# Patient Record
Sex: FEMALE | Race: WHITE | Employment: PART TIME | URBAN - METROPOLITAN AREA
[De-identification: names, ages, dates, MRNs, and addresses within clinical notes are randomized per-mention and may not be internally consistent; named-entity substitution may affect disease eponyms.]

---

## 2017-01-17 ENCOUNTER — OFFICE VISIT (OUTPATIENT)
Dept: DERMATOLOGY | Age: 42
End: 2017-01-17

## 2017-01-17 DIAGNOSIS — D48.9 NEOPLASM OF UNCERTAIN BEHAVIOR: ICD-10-CM

## 2017-01-17 DIAGNOSIS — D22.9 MULTIPLE NEVI: Primary | ICD-10-CM

## 2017-01-17 DIAGNOSIS — Z87.2 HISTORY OF ACTINIC KERATOSES: ICD-10-CM

## 2017-01-17 DIAGNOSIS — D22.9 HALO NEVUS: ICD-10-CM

## 2017-01-17 PROCEDURE — 11100 PR BIOPSY OF SKIN LESION: CPT | Performed by: DERMATOLOGY

## 2017-01-17 PROCEDURE — 99213 OFFICE O/P EST LOW 20 MIN: CPT | Performed by: DERMATOLOGY

## 2017-01-23 ENCOUNTER — TELEPHONE (OUTPATIENT)
Dept: DERMATOLOGY | Age: 42
End: 2017-01-23

## 2017-03-29 ENCOUNTER — HOSPITAL ENCOUNTER (OUTPATIENT)
Dept: MAMMOGRAPHY | Age: 42
Discharge: HOME | End: 2017-03-29
Attending: OBSTETRICS & GYNECOLOGY | Admitting: OBSTETRICS & GYNECOLOGY

## 2017-03-29 DIAGNOSIS — Z12.31 ENCOUNTER FOR SCREENING MAMMOGRAM FOR BREAST CANCER: ICD-10-CM

## 2018-01-22 ENCOUNTER — OFFICE VISIT (OUTPATIENT)
Dept: DERMATOLOGY | Age: 43
End: 2018-01-22

## 2018-01-22 DIAGNOSIS — L70.0 ACNE VULGARIS: ICD-10-CM

## 2018-01-22 DIAGNOSIS — D48.5 NEOPLASM OF UNCERTAIN BEHAVIOR OF SKIN: ICD-10-CM

## 2018-01-22 DIAGNOSIS — L57.0 AK (ACTINIC KERATOSIS): ICD-10-CM

## 2018-01-22 DIAGNOSIS — D22.9 HALO NEVUS: ICD-10-CM

## 2018-01-22 DIAGNOSIS — D22.9 MULTIPLE NEVI: Primary | ICD-10-CM

## 2018-01-22 PROCEDURE — G8421 BMI NOT CALCULATED: HCPCS | Performed by: DERMATOLOGY

## 2018-01-22 PROCEDURE — G8484 FLU IMMUNIZE NO ADMIN: HCPCS | Performed by: DERMATOLOGY

## 2018-01-22 PROCEDURE — 17000 DESTRUCT PREMALG LESION: CPT | Performed by: DERMATOLOGY

## 2018-01-22 PROCEDURE — 17003 DESTRUCT PREMALG LES 2-14: CPT | Performed by: DERMATOLOGY

## 2018-01-22 PROCEDURE — G8427 DOCREV CUR MEDS BY ELIG CLIN: HCPCS | Performed by: DERMATOLOGY

## 2018-01-22 PROCEDURE — 1036F TOBACCO NON-USER: CPT | Performed by: DERMATOLOGY

## 2018-01-22 PROCEDURE — 11100 PR BIOPSY OF SKIN LESION: CPT | Performed by: DERMATOLOGY

## 2018-01-22 PROCEDURE — 99214 OFFICE O/P EST MOD 30 MIN: CPT | Performed by: DERMATOLOGY

## 2018-01-22 RX ORDER — TRETINOIN 0.025 %
CREAM (GRAM) TOPICAL
Qty: 1 TUBE | Refills: 3 | Status: SHIPPED | OUTPATIENT
Start: 2018-01-22 | End: 2019-02-19 | Stop reason: SDUPTHER

## 2018-01-22 NOTE — PROGRESS NOTES
[facility]  Kimber Benjamin MD  [address] [facility]  1975    38 y.o. female     Date of Visit: 1/22/2018    Chief Complaint: moles, f/u AK  Chief Complaint   Patient presents with    Skin Exam     Last seen: 1-2017; her mother is a patient - Alysia Wilkinson; she has seen Dr. Nupur Jacobs in the past.    History of Present Illness:    1. Here for evaluation of multiple asx pigmented lesions on the trunk and extremities, present for many years; no change in size/shape/color of any lesions; no bleeding lesions. She has one area on the central chest/breast that developed as a pale [place] 2 years ago. She doesn't recall any lesion centrally and it hasn't changed since last seen. 2. Hx of AK - L upper back. No probs with this site. Has a few new rough lesions on the chest.    3. She notes a tender area on the R upper back when in the shower. Not sure if it is related to a mole here. 4. She has occsaional breakouts on the face. Would like to try tretinoin. Bx's in :  Lt upper back under racerback strap-actinic keratosis, pre-cancer. Return for recheck and LN2 if needed in 3 mos. Rt inframammary-benign nevus  Central back right of midline-benign nevus    No hx of skin cancer. She had a lot of sun exposure growing up; wears sunscreen and avoids the sun as an adult. Tanning beds in the past as a teen. Review of Systems:  Gen: Feels well, good sense of health. Skin: No changing moles or lesions. Past Medical History, Family History, Surgical History, Medications and Allergies reviewed.     Past Medical History:   Diagnosis Date    Bunion     IUD        Past Surgical History:   Procedure Laterality Date    BUNIONECTOMY      left       No outpatient prescriptions have been marked as taking for the 1/22/18 encounter (Office Visit) with Tana Saldana MD.       Allergies   Allergen Reactions    Penicillins          Physical Examination     Gen,

## 2018-01-25 ENCOUNTER — TELEPHONE (OUTPATIENT)
Dept: DERMATOLOGY | Age: 43
End: 2018-01-25

## 2018-11-20 ENCOUNTER — TELEPHONE (OUTPATIENT)
Dept: DERMATOLOGY | Age: 43
End: 2018-11-20

## 2019-02-19 ENCOUNTER — OFFICE VISIT (OUTPATIENT)
Dept: DERMATOLOGY | Age: 44
End: 2019-02-19
Payer: COMMERCIAL

## 2019-02-19 DIAGNOSIS — D22.9 MULTIPLE NEVI: Primary | ICD-10-CM

## 2019-02-19 DIAGNOSIS — D22.9 HALO NEVUS: ICD-10-CM

## 2019-02-19 DIAGNOSIS — D48.5 NEOPLASM OF UNCERTAIN BEHAVIOR OF SKIN: ICD-10-CM

## 2019-02-19 DIAGNOSIS — Z87.2 HISTORY OF ACTINIC KERATOSIS: ICD-10-CM

## 2019-02-19 DIAGNOSIS — L70.0 ACNE VULGARIS: ICD-10-CM

## 2019-02-19 PROCEDURE — 11102 TANGNTL BX SKIN SINGLE LES: CPT | Performed by: DERMATOLOGY

## 2019-02-19 PROCEDURE — 99213 OFFICE O/P EST LOW 20 MIN: CPT | Performed by: DERMATOLOGY

## 2019-02-19 PROCEDURE — 11103 TANGNTL BX SKIN EA SEP/ADDL: CPT | Performed by: DERMATOLOGY

## 2019-02-21 LAB — DERMATOLOGY PATHOLOGY REPORT: NORMAL

## 2019-03-04 NOTE — TELEPHONE ENCOUNTER
Submitted PA through [employer] com. Received fax with PA approval from 3/4/19-3/3/20. Will scan and notify pharmacy.

## 2020-01-21 ENCOUNTER — OFFICE VISIT (OUTPATIENT)
Dept: DERMATOLOGY | Age: 45
End: 2020-01-21
Payer: COMMERCIAL

## 2020-01-21 PROCEDURE — 99214 OFFICE O/P EST MOD 30 MIN: CPT | Performed by: DERMATOLOGY

## 2020-01-21 NOTE — PROGRESS NOTES
MD Mirza.       Allergies   Allergen Reactions    Penicillins          Physical Examination     Gen, well-appearing  The following were examined and determined to be normal: Psych/Neuro, Scalp/hair, Conjunctivae/eyelids, Gums/teeth/lips, Neck, RUE, LUE, RLE, LLE, Nails/digits and buttocks. Abdome. Chest, face, bback  The following were examined and determined to be abnormal: none    trunk and extremities with numerous brown macules and papules; central chest/breast with 2 cm hypopigmented round macule; no central lesion - stable  Chest clear  No acne                *photos from previous bx's in chart    Assessment and Plan     1. Multiple benign-appearing nevi and halo nevus (central chest btwn breasts) - stable  2. AK's - chest - clear today  - educ re ABCD's of MM   educ sun protection   encouraged skin check yearly (sooner if indicated), self checks    3. Lentigines - chest - ed IPL for lentigines     3.  Acne vulgaris - mild  - Tretinoin 0.025% cr qhs to face  educ irrit and photosens, no preg  - also discussed cosmetic potential with of tretinoin

## 2020-06-24 ENCOUNTER — OFFICE VISIT (OUTPATIENT)
Dept: PRIMARY CARE CLINIC | Age: 45
End: 2020-06-24
Payer: COMMERCIAL

## 2020-06-24 VITALS — OXYGEN SATURATION: 98 % | TEMPERATURE: 98.3 F | HEART RATE: 68 BPM

## 2020-06-24 PROCEDURE — 99212 OFFICE O/P EST SF 10 MIN: CPT | Performed by: NURSE PRACTITIONER

## 2020-06-26 LAB
SARS-COV-2: NOT DETECTED
SOURCE: NORMAL

## 2020-09-30 NOTE — PROGRESS NOTES
Francisco Quiñonez   1975, 39 y.o. female   [id number]       Referring Provider: Michelle Blankenship MD  Referral Type: In an order in [address] [place] [address]    Reason for Visit: Evaluation of suspected change in hearing, tinnitus, or balance. ADULT AUDIOLOGIC EVALUATION      Francisco Quiñonez is a 39 y.o. female seen today, 10/9/2020 for an initial audiologic evaluation. Temperature taken during ENT appointment    [address] [facility] HISTORY:        Francisco Quiñonez noted ear pressure bilaterally, feels deep in her ears; sensitivity to loud sounds, present for about a year; history of vertigo about 4 months ago that lasted 2-3 weeks, spinning sensation; some general noise exposure from listening to music in the house, and appliances like a  that was used daily. Francisco Quiñonez denied otalgia, otorrhea, tinnitus, history of head trauma, history of ear surgery, and family history of hearing loss. IMPRESSIONS:       Today's results are consistent with hearing sensitivity within normal limits with normal middle ear function bilaterally. Discussed good communication strategies and noise-induced hearing loss/prevention. Follow recommendations from Dr. Mayur Cee regarding ear pressure. ASSESSMENT AND FINDINGS:       Otoscopy revealed: Clear ear canals bilaterally      RIGHT EAR:  Hearing Sensitivity: Within normal limits. Speech Recognition Threshold: 5 dB HL  Word Recognition: Excellent (96%), based on NU-6 25-word list at 45 dBHL using recorded speech stimuli. This finding is consistent with hearing sensitivity. Tympanometry: Normal peak pressure and compliance, Type A tympanogram, consistent with normal middle ear function. LEFT EAR:  Hearing Sensitivity: Within normal limits. Speech Recognition Threshold: 5 dB HL  Word Recognition: Excellent (100%), based on NU-6 25-word list at 45 dBHL using recorded speech stimuli.   This finding is consistent with hearing

## 2020-10-09 ENCOUNTER — OFFICE VISIT (OUTPATIENT)
Dept: ENT CLINIC | Age: 45
End: 2020-10-09
Payer: COMMERCIAL

## 2020-10-09 ENCOUNTER — PROCEDURE VISIT (OUTPATIENT)
Dept: AUDIOLOGY | Age: 45
End: 2020-10-09
Payer: COMMERCIAL

## 2020-10-09 VITALS
HEIGHT: 67 IN | SYSTOLIC BLOOD PRESSURE: 125 MMHG | RESPIRATION RATE: 16 BRPM | DIASTOLIC BLOOD PRESSURE: 81 MMHG | HEART RATE: 76 BPM | BODY MASS INDEX: 19.68 KG/M2 | TEMPERATURE: 97.9 F | WEIGHT: 125.4 LBS

## 2020-10-09 PROCEDURE — G8420 CALC BMI NORM PARAMETERS: HCPCS | Performed by: OTOLARYNGOLOGY

## 2020-10-09 PROCEDURE — 92567 TYMPANOMETRY: CPT | Performed by: AUDIOLOGIST

## 2020-10-09 PROCEDURE — 92552 PURE TONE AUDIOMETRY AIR: CPT | Performed by: AUDIOLOGIST

## 2020-10-09 PROCEDURE — G8484 FLU IMMUNIZE NO ADMIN: HCPCS | Performed by: OTOLARYNGOLOGY

## 2020-10-09 PROCEDURE — 92556 SPEECH AUDIOMETRY COMPLETE: CPT | Performed by: AUDIOLOGIST

## 2020-10-09 PROCEDURE — 99203 OFFICE O/P NEW LOW 30 MIN: CPT | Performed by: OTOLARYNGOLOGY

## 2020-10-09 PROCEDURE — G8427 DOCREV CUR MEDS BY ELIG CLIN: HCPCS | Performed by: OTOLARYNGOLOGY

## 2020-10-09 RX ORDER — TRIAMTERENE AND HYDROCHLOROTHIAZIDE 37.5; 25 MG/1; MG/1
1 TABLET ORAL DAILY
Qty: 30 TABLET | Refills: 3 | Status: SHIPPED | OUTPATIENT
Start: 2020-10-09

## 2020-10-09 NOTE — Clinical Note
Dr. Lázaro Clarke,    Please see note from this patient's audiogram from today. Please let me know if there is anything further you need.       Vanita Prasad [address] [place]  Audiologist

## 2020-10-09 NOTE — PATIENT INSTRUCTIONS
directly into a persons ear      Some additional items that may be helpful:   - Remain patient - this is important for both parties   - Write down items that still cannot be heard/understood. You may write with pen/paper or consider typing/texting on a cell phone or smart device. - If background noise is unavoidable, try to keep yourself in a good position in the room. By sitting at a bailon on the side of the restaurant (preferably a corner), it will be easier to communicate than if you were sitting at a table in the middle with background noise surrounding you. Keep yourself positioned away from music speakers or heavy foot traffic.   - If you have difficulty with the television, consider these options:      -- Use closed-captioning, which is a setting you can turn on that displays the spoken words in a written form on the screen. There may be a slight delay, but this can help fill in missing information. This can be especially helpful when watching programs with accented speech. -- Consider use of a sound bar or speakers that come from the front of the TV. With modern flat screen TVs, many of them have speakers that come out of the back of the device, which makes sound bounce off the wall behind it, then go into the room. Sound bars can allow the sound to go straight in your direction and can improve sound quality. -- Consider ear level devices to help improve the volume and/or sound quality of the program.  There are devices that work like headphones that you can adjust the volume for your ears while others can have the volume at a more comfortable level, such as \"TV Ears\". Most hearing aids have devices that allow them to connect directly to the TV and improve sound quality. Noise-Induced Hearing Loss  What it is, and what you can do to prevent it    Exposure to loud sounds, in an occupational setting or recreational, can cause permanent hearing loss. Sound is measured in decibels (dB). Noise-induced hearing loss is the ONLY type of preventable hearing loss. Hearing loss related to noise exposure can occur at any age. There are small sensory cells, called inner and outer hair cells, within the inner ear (cochlea). These cells process the loudness (intensity) and pitch (frequency) of sound and send the signal to the brain via our auditory nerve (vestibulocochlear nerve, cranial nerve VIII). When these cells are damaged, they can result in permanent hearing loss and/or tinnitus. The hair cells responsible for high frequency sounds, like birds chirping, are most likely to be damaged due to loud sounds. The high frequency sounds are also very important for our clarity and understanding of speech. OCCUPATIONAL NOISE EXPOSURE RECREATIONAL NOISE EXPOSURE   Some jobs may have exposure to loud sounds in the workplace. These jobs may include but are not limited to:  Written   Construction   Welding   Landscaping   Hairdressing/hairstyling   Musicians  [employer]    ... And more! Many activities outside of work may cause permanent hearing loss. These activities may include but are not limited to:  Lawnmowers, leaf blowers  [employer] (such as pigs squealing)   Chainsaws and other power tools  [employer] musical instruments and/or singing   Listening to music too loudly - at concerts, through stereo, through ear buds or headphones   Attending sporting events   Attending fireworks shows or using fireworks at home  [employer] of firearms   . .. And more! REDUCE OR PROTECT YOUR EARS FROM NOISE EXPOSURE    To do your best to avoid noise-induced hearing loss, here are some tips:   Limit exposure to loud sounds. 85 dB (decibels) is safe for 8 hours. As sounds are louder, the length of time the sound is safe lessens. These numbers are cumulative across a 24-hour period.   (NIOSH and CDC, 2002)  o 85 dB is safe for 8 ear plugs. Protective ear muffs are commonly found at home improvement or sporting good stores, they can be worn time and time again and are great if you need to take your hearing protection off frequently. Ear plugs are often made of foam or soft silicone. The foam ones are designed for one-time use, while silicone ear plugs may be used multiple times. There are also \"filtered\" ear plugs that help provide even attenuation of the sound across all frequencies. These are great for listening to music or going to concerts, and allow for better understanding of speech in louder environments. They can be purchased at music stores or online retailers (search \"Ety Plugs\" or \"filtered ear plugs\"), or custom earmolds can be made with an audiologist.    There are \"custom\" hearing protection devices that you can further discuss with your audiologist based on your specific needs, if desired. Exposure to these sounds may cause permanent damage to your hearing.   If you suspect your hearing has changed, it is recommended that you have your hearing tested by your audiologist.

## 2020-10-09 NOTE — PROGRESS NOTES
auditory canals, tympanic membranes, and middle ear spaces  TUNING FORKS: Rinne ++ Arizmendi midline at 512 Hz  INTRANASAL:  Septum midline, turbinates normal, meati clear. LIPS, TEETH, GINGIVA:  Normal mucosa  PHARYNX:  Normal  NECK:  No masses. LYMPHATIC:  No cervical adenopathy  SALIVARY GLANDS:  No swelling or masses in the parotid or submandibular salivary glands  THYROID:  No goiter or thyroid masses. AUDIOGRAM & TYMPANOGRAM ORDERED AND REVIEWED: Normal  IMPRESSION: Normal otoscopy and audiometric evaluation. PLAN: Even though this is a reach, entertain the remote possibility that this is due to cochlear hydrops. Trial of triamterene/hydrochlorothiazide for 2 weeks to see if symptoms are improved. FOLLOW-UP: By phone in 2 weeks.

## 2020-11-09 ENCOUNTER — TELEPHONE (OUTPATIENT)
Dept: ENT CLINIC | Age: 45
End: 2020-11-09

## 2020-11-09 NOTE — TELEPHONE ENCOUNTER
LMOM that Dr Hai Guzman is not in the office today and that we will call her back tomorrow after Dr Hai Guzman has addressed her message.   dwight
Patient only took the triamterene/hydrochlorothiazide for 2 days. I informed patient that per Dr Ab Viera notes she was suppose to take the medicine for 2 weeks trial and see how she does and let the office know how she is doing. She will try the medicine for a 2 week trial and let us know how she is doing.   dwight
Please call patient she has questions, she still has ear pressure and thinks she may have ear infection  please advise (also she did not finish RX given by Dr Perry Caro thinking that it was not working)
Plan: Wash face with gentle cleanser every morning \\nAdapalene daily to entire face at night \\nWash face with gentle cleanser every night \\nOil free Moisturize as needed per dryness\\nBPO wash daily to chest and back in shower\\n500mg Niacinomide twice a day
Detail Level: Simple

## 2020-12-10 ENCOUNTER — OFFICE VISIT (OUTPATIENT)
Dept: ORTHOPEDIC SURGERY | Age: 45
End: 2020-12-10
Payer: COMMERCIAL

## 2020-12-10 VITALS — BODY MASS INDEX: 19.62 KG/M2 | WEIGHT: 125 LBS | HEIGHT: 67 IN

## 2020-12-10 PROCEDURE — G8427 DOCREV CUR MEDS BY ELIG CLIN: HCPCS | Performed by: PODIATRIST

## 2020-12-10 PROCEDURE — G8484 FLU IMMUNIZE NO ADMIN: HCPCS | Performed by: PODIATRIST

## 2020-12-10 PROCEDURE — 99203 OFFICE O/P NEW LOW 30 MIN: CPT | Performed by: PODIATRIST

## 2020-12-10 PROCEDURE — G8420 CALC BMI NORM PARAMETERS: HCPCS | Performed by: PODIATRIST

## 2020-12-10 PROCEDURE — 1036F TOBACCO NON-USER: CPT | Performed by: PODIATRIST

## 2020-12-10 RX ORDER — PREDNISONE 10 MG/1
TABLET ORAL
Qty: 26 TABLET | Refills: 0 | Status: SHIPPED | OUTPATIENT
Start: 2020-12-10

## 2020-12-10 NOTE — PROGRESS NOTES
noted to have an extremely short second metatarsal.  It is shorter than the first and third metatarsals. The sesamoid apparatus is in good position and the fibular sesamoid is at the lateral edge of the first metatarsal head. ASSESSMENT: Hallux limitus and tenosynovitis, right foot. PLAN: The patient was educated on the pathology and its treatment options. The x-rays were reviewed with the patient. Both conservative and surgical treatment options were presented. The progressive nature of this problem was discussed. I prescribed a prednisone 10 mg taper. We discussed potential adverse reactions. We discussed we discussed the chronic nature of an arthritic big toe joint. Other factors for her affecting her pain level would be the shape of the first metatarsal head and the length of the second metatarsal.    I will see her back in 3 weeks if she continues having pain.

## 2020-12-22 LAB
HPV COMMENT: NORMAL
HPV TYPE 16: NOT DETECTED
HPV TYPE 18: NOT DETECTED
HPVOH (OTHER TYPES): NOT DETECTED

## 2021-01-13 ENCOUNTER — HOSPITAL ENCOUNTER (OUTPATIENT)
Dept: MAMMOGRAPHY | Age: 46
Discharge: HOME | End: 2021-01-13
Payer: COMMERCIAL

## 2021-01-13 DIAGNOSIS — Z12.31 ENCOUNTER FOR SCREENING MAMMOGRAM FOR BREAST CANCER: ICD-10-CM

## 2021-01-13 PROCEDURE — 77063 BREAST TOMOSYNTHESIS BI: CPT

## 2021-01-21 ENCOUNTER — APPOINTMENT (OUTPATIENT)
Dept: ULTRASOUND IMAGING | Age: 46
End: 2021-01-21
Payer: COMMERCIAL

## 2021-01-21 ENCOUNTER — HOSPITAL ENCOUNTER (OUTPATIENT)
Dept: WOMENS IMAGING | Age: 46
Discharge: HOME | End: 2021-01-21
Payer: COMMERCIAL

## 2021-01-21 DIAGNOSIS — R92.8 ABNORMAL MAMMOGRAM OF LEFT BREAST: ICD-10-CM

## 2021-01-21 PROCEDURE — G0279 TOMOSYNTHESIS, MAMMO: HCPCS

## 2021-01-28 ENCOUNTER — OFFICE VISIT (OUTPATIENT)
Dept: DERMATOLOGY | Age: 46
End: 2021-01-28
Payer: COMMERCIAL

## 2021-01-28 VITALS — TEMPERATURE: 97.2 F

## 2021-01-28 DIAGNOSIS — L70.0 ACNE VULGARIS: ICD-10-CM

## 2021-01-28 DIAGNOSIS — D22.9 HALO NEVUS: ICD-10-CM

## 2021-01-28 DIAGNOSIS — D48.5 NEOPLASM OF UNCERTAIN BEHAVIOR OF SKIN: ICD-10-CM

## 2021-01-28 DIAGNOSIS — D22.9 MULTIPLE NEVI: Primary | ICD-10-CM

## 2021-01-28 DIAGNOSIS — L81.4 LENTIGINES: ICD-10-CM

## 2021-01-28 DIAGNOSIS — L57.0 AK (ACTINIC KERATOSIS): ICD-10-CM

## 2021-01-28 PROCEDURE — G8427 DOCREV CUR MEDS BY ELIG CLIN: HCPCS | Performed by: DERMATOLOGY

## 2021-01-28 PROCEDURE — 1036F TOBACCO NON-USER: CPT | Performed by: DERMATOLOGY

## 2021-01-28 PROCEDURE — 11102 TANGNTL BX SKIN SINGLE LES: CPT | Performed by: DERMATOLOGY

## 2021-01-28 PROCEDURE — 17000 DESTRUCT PREMALG LESION: CPT | Performed by: DERMATOLOGY

## 2021-01-28 PROCEDURE — G8420 CALC BMI NORM PARAMETERS: HCPCS | Performed by: DERMATOLOGY

## 2021-01-28 PROCEDURE — 99214 OFFICE O/P EST MOD 30 MIN: CPT | Performed by: DERMATOLOGY

## 2021-01-28 PROCEDURE — G8484 FLU IMMUNIZE NO ADMIN: HCPCS | Performed by: DERMATOLOGY

## 2021-01-28 NOTE — PROGRESS NOTES
[facility] Dermatology  Sharron Suazo MD  [address] [facility]  1975    42 y.o. female     Date of Visit: 1/28/2021    Chief Complaint: moles, f/u AK  Chief Complaint   Patient presents with    Skin Exam     Last seen: 1-2020; her mother is a patient - Jessica ; she had seen Dr. Rossi Avalos in the past    *she and her  build homes  *daughter - Monae Burrell - has eczema - is a patient  *son is at [facility] [facility] [employer]    History of Present Illness:    1. Here for evaluation of multiple asx pigmented lesions on the trunk and extremities, present for many years; no change in size/shape/color of any lesions; no bleeding lesions. She has one area on the central chest/breast that developed as a pale [place] ~2014. She didn't recall any lesion centrally and it hasn't changed since then except has gradually become more subtle. 2. Hx of AK - L upper back and chest.  No probs with this site. One new rough spot on the chest.     3. She has lentigines/dyspigmentatinon from photodamage on the chest. Asx. Wears sunscreen. 4. She has occasional breakouts on the face. Would like to cont tretinoin - helps some. 5. She has a concerning pigmented lesion on the L back. Bx's in :  Lt upper back under racerback strap-actinic keratosis, pre-cancer. Return for recheck and LN2 if needed in 3 mos. Rt inframammary-benign nevus  Central back right of midline-benign nevus    No hx of skin cancer. She had a lot of sun exposure growing up; wears sunscreen and avoids the sun as an adult. Tanning beds in the past as a teen. Review of Systems:  Gen: Feels well, good sense of health. Skin: No changing moles or lesions. Past Medical History, Family History, Surgical History, Medications and Allergies reviewed.     Past Medical History:   Diagnosis Date    Bunion     IUD     Nosebleed        Past Surgical History:   Procedure Laterality Date    BUNIONECTOMY      left       Outpatient Medications Marked as Taking for the 1/28/21 encounter (Office Visit) with Nitza Braga MD   Medication Sig Dispense Refill    tretinoin (RETIN-A) 0.025 % cream Apply pea sized amount to face QOD, then increase to every night as tolerated. 1 Tube 3       Allergies   Allergen Reactions    Penicillins          Physical Examination     Gen, well-appearing  The following were examined and determined to be normal: Psych/Neuro, Scalp/hair, Conjunctivae/eyelids, Gums/teeth/lips, Neck, RUE, LUE, RLE, LLE, Nails/digits and buttocks. Abdome. Chest, face, bback  The following were examined and determined to be abnormal: none    trunk and extremities with numerous brown macules and papules; central chest/breast with 2 cm hypopigmented round macule; no central lesion - stable  R upper with erythematous roughly scaled macule  Few small comedones on the chin  L lateral back/axillary line with brown-black macule                *photos from previous bx's in chart    Assessment and Plan     1. Multiple benign-appearing nevi and halo nevus (central chest btwn breasts) - stable  - educ re si/sx/ABCD's of MM   educ sun protection - OTC sunscreen with SPF 30-50+ recommended and reviewed usage  encouraged skin check yearly (sooner if indicated), self checks    2. AK's - Chest - R upper - 1 new  - lesion(s) treated with liquid nitrogen x 2 cycles. Patient educated on risk of blister, hypopigmentation/scar and wound care. 3. Lentigines/photodamage - chest - ed IPL for lentigines  - cont sun protection SPF 30-50+     4. Acne vulgaris - mild  - Tretinoin 0.025% cr qhs to face  educ irrit and photosens, no preg  - also discussed cosmetic potential with use of tretinoin     5. L lateral back/axillary line - r/o dysplastic nevus  - Shave biopsy performed after verbal consent obtained. Patient educated regarding risk of bleeding, infection, scar and educated on wound care.    Skin cleansed with alcohol pad and site anesthetized with lido + epi. Aluminum chloride applied to site for hemostasis. Petrolatum ointment and bandage applied. Specimen bottle labeled with patient information and site and specimen sent to dermpath.

## 2021-02-01 LAB — DERMATOLOGY PATHOLOGY REPORT: NORMAL

## 2021-08-09 ENCOUNTER — HOSPITAL ENCOUNTER (OUTPATIENT)
Dept: MAMMOGRAPHY | Age: 46
Discharge: HOME | End: 2021-08-09
Payer: COMMERCIAL

## 2021-08-09 ENCOUNTER — APPOINTMENT (OUTPATIENT)
Dept: ULTRASOUND IMAGING | Age: 46
End: 2021-08-09
Payer: COMMERCIAL

## 2021-08-09 DIAGNOSIS — R92.8 ABNORMAL MAMMOGRAM: ICD-10-CM

## 2021-08-09 PROCEDURE — 77065 DX MAMMO INCL CAD UNI: CPT

## 2022-11-01 ENCOUNTER — OFFICE VISIT (OUTPATIENT)
Dept: SURGERY | Age: 47
End: 2022-11-01
Payer: COMMERCIAL

## 2022-11-01 VITALS
HEIGHT: 67 IN | WEIGHT: 131 LBS | SYSTOLIC BLOOD PRESSURE: 134 MMHG | HEART RATE: 57 BPM | BODY MASS INDEX: 20.56 KG/M2 | TEMPERATURE: 98.2 F | DIASTOLIC BLOOD PRESSURE: 86 MMHG

## 2022-11-01 DIAGNOSIS — K62.89 RECTAL DISCOMFORT: Primary | ICD-10-CM

## 2022-11-01 PROCEDURE — 99203 OFFICE O/P NEW LOW 30 MIN: CPT | Performed by: SURGERY

## 2022-11-01 NOTE — PROGRESS NOTES
[address] [facility] COLORECTAL SURGERY  [address] [place] [address],[facility] [address]  Dept: [phone]  Dept Fax: [phone]  Loc: [phone]    Visit Date: 11/1/2022    Yuan Villegas is a 41 y.o. female who presents today for: New Patient (Hemorrhoid )      HPI:       Yuan Villegas is a 41 y.o. female referred to me for further evaluation regarding possible hemorrhoid. Enoch Allison tells me that she started having hemorrhoid type symptoms 18 years ago with the birth of her son. Her main complaints include abnormal tissue prolapse feeling and tearing sensation. No previous colonoscopy. Denies rectal bleeding. Denies family history of colon cancer    Patient's problem list, medications, past medical, surgical, family, and social histories were reviewed and updated in the chart as indicated today. Past Medical History:   Diagnosis Date    Bunion     IUD     Nosebleed     PONV (postoperative nausea and vomiting)     Vertigo     occ       Past Surgical History:   Procedure Laterality Date    BACK SURGERY      cervical    BUNIONECTOMY      left       Cancer-related family history is not on file. Social History:   Social History     Tobacco Use    Smoking status: Never    Smokeless tobacco: Never   Substance Use Topics    Alcohol use: Yes     Comment: soc      Tobacco cessation counseling provided as appropriate. REVIEW OF SYSTEMS:    Pertinent positives and negatives are mentioned in the HPI above. Otherwise, all other systems were reviewed and negative. Objective:     Physical Exam   /86 (Site: Right Upper Arm, Position: Sitting)   Pulse 57   Temp 98.2 °F (36.8 °C) (Temporal)   Ht 5' 7\" (1.702 m)   Wt 131 lb (59.4 kg)   BMI 20.52 kg/m²   Constitutional: Appears well-developed and well-nourished. Grooming appropriate. No gross deformities. Body mass index is 20.52 kg/m². Eyes: No scleral icterus.  Conjunctiva/lids normal. Vision intact grossly. Pupils equal/symmetric, reactive bilaterally. ENT: External ears/nose without defect, scars, or masses. Hearing grossly intact. No facial deformity. Lips normal, normal dentition. Neck: No masses. Trachea midline. No crepitus. Thyroid not enlarged. Cardiovascular: Normal rate. No peripheral edema. Abdominal aorta normal size to palpation. Pulmonary/Chest: Effort normal. No respiratory distress. No wheezes. No use of accessory muscles. Musculoskeletal: Normal range of motion x all 4 extremities and head/neck, without deformity, pain, or crepitus, with normal strength and tone. Normal gait. Nails without clubbing or cyanosis. Neurological: Alert and oriented to person, place, and time. No gross deficits. Sensation intact. Skin: Skin is dry. No rashes noted. No pallor. No induration of nodules. Psychiatric: Normal mood and affect. Behavior normal. Oriented to person, place, and time. Judgment and insight reasonable. Abdominal/wound: soft, nontender    Anorectal Exam: Chaperone present in room throughout exam.  Patient placed in the left lateral position. Buttocks spread. Digital rectal exam performed with lubricated finger. Anoscopy performed. Findings reveal: Normal anorectal exam with exception of very small anterior skin tag    Labs reviewed: none  Radiology reviewed: none    Last colonoscopy: none      Assessment/Plan:     A/P:  New problem(s) with uncertain prognosis: Anorectal discomfort of unknown etiology  Established problem(s): None  Additional workup/treatment planned: Recommend colonoscopy for screening purposes  Risk of complications/morbidity: Low    As far as her anorectal symptoms, they are actually improving spontaneously. Additionally, there is no concerning findings on anorectal exam for hemorrhoids or other abnormality. Discussed general care, including fiber, OTC supplementation, water, healthy dietary choices.   I did discuss that if her symptoms recur, happy to see her back. We briefly discussed hemorrhoid treatment including rubber band ligation. I also discussed with her possibility that this could be underlying pelvic floor dysfunction and discussed pelvic floor physical therapy as the primary treatment option for this. I recommend she speak with her primary care physician and obtain a referral for colonoscopy as well, given her age. Continue with current medications    DISPOSITION: f/u PRN    My findings will be relayed to consulting practitioner or PCP via Epic    Note completed using dictation software, please excuse any errors.     Electronically signed by Lou Dinero MD on 11/1/2022 at 1:36 PM

## 2022-12-21 ENCOUNTER — TELEPHONE (OUTPATIENT)
Dept: DERMATOLOGY | Age: 47
End: 2022-12-21

## 2022-12-21 ENCOUNTER — PATIENT MESSAGE (OUTPATIENT)
Dept: DERMATOLOGY | Age: 47
End: 2022-12-21

## 2022-12-21 NOTE — TELEPHONE ENCOUNTER
Spoke to patient regarding random itching mostly on upper body (chest and back) and slightly on her legs. Patient denies rash, hives or anything visible to see. Patient changed fabric softener and used x 1 week, but has since stopped in the last month. Patient advised to stop Oil of olay body wash and hot tub use. Patient aware Dr. Sally Brown is out of the office. Forwarded dry skin care reminders to patient through [employer] for patient to follow. I will touch base with patient on Tuesday to see how she is doing. Patient last seen 1/2021. Advice for patient to follow until follow up?

## 2022-12-21 NOTE — TELEPHONE ENCOUNTER
Pt calling states she is having some uncontrolable itching for 3 wks in different spots of her body have some concern if it may due to medication not sure pls return call back @ [phone] to discuss

## 2022-12-22 NOTE — TELEPHONE ENCOUNTER
From: Concha Buck  Sent: 12/22/2022 8:08 AM EST  To: Sejal Lynch Clinical Staff Pool  Subject: dry skin care reminders    Thank you, still very itchy in the chest, shoulders, and back area this morning. Noticed that there are bumps but they're not really bumps, they're more of what I would say skin tags. They're not that big but can feel them when I put the lotion on. Don't see them but could scratch them off. So bizarre, can't pin point that that's where the itching is coming from because that's all over. They're more like little raised pin heads. I'll continue to lotion up. Have a great Sonny!

## 2023-01-06 NOTE — TELEPHONE ENCOUNTER
I'm happy to see her if she is still itchy or having any other concerns. If she is doing well, then just see when she's due for her regular check up to see if it needs scheduled. Kenyetta Cronin

## 2023-01-23 ENCOUNTER — OFFICE VISIT (OUTPATIENT)
Dept: DERMATOLOGY | Age: 48
End: 2023-01-23
Payer: COMMERCIAL

## 2023-01-23 DIAGNOSIS — L29.9 PRURITUS: ICD-10-CM

## 2023-01-23 DIAGNOSIS — L73.8 PITYROSPORUM FOLLICULITIS: Primary | ICD-10-CM

## 2023-01-23 DIAGNOSIS — L57.0 AK (ACTINIC KERATOSIS): ICD-10-CM

## 2023-01-23 DIAGNOSIS — D22.9 MULTIPLE NEVI: ICD-10-CM

## 2023-01-23 DIAGNOSIS — L70.0 ACNE VULGARIS: ICD-10-CM

## 2023-01-23 DIAGNOSIS — L81.4 LENTIGINES: ICD-10-CM

## 2023-01-23 PROCEDURE — 99214 OFFICE O/P EST MOD 30 MIN: CPT | Performed by: DERMATOLOGY

## 2023-01-23 RX ORDER — TRIAMCINOLONE ACETONIDE 1 MG/G
CREAM TOPICAL
Qty: 80 G | Refills: 2 | Status: SHIPPED | OUTPATIENT
Start: 2023-01-23

## 2023-01-23 RX ORDER — KETOCONAZOLE 20 MG/ML
SHAMPOO TOPICAL
Qty: 120 ML | Refills: 4 | Status: SHIPPED | OUTPATIENT
Start: 2023-01-23

## 2023-01-23 NOTE — PROGRESS NOTES
[facility] Dermatology  Maximus Butcher MD  [phone]      Jules Gallardo  1975    41 y.o. female     Date of Visit: 1/23/2023    Chief Complaint: moles, f/u AK  Chief Complaint   Patient presents with    Skin Exam    Pruritis     Doing better since December-nothing to see     Last seen: 1-2021; her mother is a patient - Amy Tilley; she had seen Dr. Bipin Burns in the past    *she and her  build homes  *daughter - Efraín Villalba - has eczema - is a patient  *son is at [facility]    History of Present Illness:    1. C/o that she has had markedly pruritic moments since early December - chest, upper back, shoulders. Gets flares with tiny papules, pustules or sometimes not as much of a rash and just sx. Better somewhat over the past several weeks. Legs intermittent and focal pruritus - not as bad. Notes hx of itching and rashes with ocean water, occasionally with drinking alcohol. No new products/medications except notes supplement - [employer] workout supplement that goes in water. 2. Here for evaluation of multiple asx pigmented lesions on the trunk and extremities, present for many years; no change in size/shape/color of any lesions; no bleeding lesions. She has one area on the central chest/breast that developed as a pale [place] ~2014. She didn't recall any lesion centrally and it hasn't changed since then except has gradually faded since then. 3. Hx of AK - L upper back and chest.  No probs with this site. One new rough spot on the chest.     4. She has lentigines/dyspigmentatinon from photodamage on the chest. Asx. Wears sunscreen. 5. She has occasional breakouts on the face. Would like to cont tretinoin - helps some. Bx's in :  Lt upper back under racerback strap-actinic keratosis, pre-cancer. Return for recheck and LN2 if needed in 3 mos. Rt inframammary-benign nevus  Central back right of midline-benign nevus    No hx of skin cancer.   She had a lot of sun exposure growing up; wears sunscreen and avoids the sun as an adult. Tanning beds in the past as a teen. Review of Systems:  Gen: Feels well, good sense of health. Skin: No changing moles or lesions. Past Medical History, Family History, Surgical History, Medications and Allergies reviewed. Past Medical History:   Diagnosis Date    Bunion     IUD     Nosebleed     PONV (postoperative nausea and vomiting)     Vertigo     occ       Past Surgical History:   Procedure Laterality Date    BACK SURGERY      cervical    BUNIONECTOMY      left       Outpatient Medications Marked as Taking for the 1/23/23 encounter (Office Visit) with Madison Kohli MD   Medication Sig Dispense Refill    tretinoin (RETIN-A) 0.025 % cream Apply pea sized amount to face QOD, then increase to every night as tolerated. 1 Tube 3       Allergies   Allergen Reactions    Penicillins Rash         Physical Examination     Gen, well-appearing  FSE today    trunk and extremities with numerous brown macules and papules; central chest/breast previously with 2 cm hypopigmented round macule; no central lesion - not really visible at this point  No AK's  Few small comedones on the chin    Chest, upper back with few pinpoint erythematous papules and pustules                *photos from previous bx's in chart    Assessment and Plan     1. ? 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  Multiple benign-appearing nevi and hx halo nevus (central chest btwn breasts)  - Monitor for ABCD's of MM and si/sx of NMSC  Continue sun protection - OTC sunscreen with SPF 30-50+ recommended and reviewed usage  Encouraged skin check yearly (sooner if indicated), self checks    3. AK's - none today  - cont sun protection    4. Lentigines/photodamage - chest - ed IPL for lentigines  - cont sun protection SPF 30-50+     5.  Acne vulgaris - mild, stable  - Tretinoin 0.025% cr qhs to face  educ irrit and photosens, no preg  - also discussed cosmetic potential with use of tretinoin

## 2024-08-01 ENCOUNTER — TELEPHONE (OUTPATIENT)
Dept: DERMATOLOGY | Age: 49
End: 2024-08-01

## 2024-10-08 ENCOUNTER — OFFICE VISIT (OUTPATIENT)
Dept: DERMATOLOGY | Age: 49
End: 2024-10-08

## 2024-10-08 DIAGNOSIS — L57.0 AK (ACTINIC KERATOSIS): ICD-10-CM

## 2024-10-08 DIAGNOSIS — D22.9 MULTIPLE NEVI: Primary | ICD-10-CM

## 2024-10-08 DIAGNOSIS — L81.4 LENTIGINES: ICD-10-CM

## 2024-10-08 DIAGNOSIS — L70.0 ACNE VULGARIS: ICD-10-CM

## 2024-10-08 NOTE — PROGRESS NOTES
[facility] Dermatology  Allegra Blue MD  [phone]      Kendy Sanchez  1975    49 y.o. female     Date of Visit: 10/8/2024    Chief Complaint: moles, f/u AK  Chief Complaint   Patient presents with    Skin Exam     Last seen: 1-2023; her mother is a patient - Peyton Miranda; she had seen Dr. Novak in the past    *she and her  build homes  *daughter - Ana - has eczema - is a patient - at [employer], playing [employer], knee injury recently  *son is at [employer]    History of Present Illness:    1. Here for evaluation of multiple asx pigmented lesions on the trunk and extremities, present for many years; no change in size/shape/color of any lesions; no bleeding lesions.  She has one area on the central chest/breast that developed as a pale [place] ~2014.  She didn't recall any lesion centrally and it hasn't changed since then except has gradually faded since then.    2. Hx of AK - L upper back and chest.  No probs with this site. One new rough spot on the chest.     3. She has lentigines/dyspigmentatinon from photodamage on the face and chest. Asx. Wears sunscreen.  Interested in laser for the face.    4. She has occasional breakouts on the face.  Would like to cont tretinoin - helps some.      Bx's in :  Lt upper back under racerback strap-actinic keratosis, pre-cancer. Return for recheck and LN2 if needed in 3 mos.  Rt inframammary-benign nevus  Central back right of midline-benign nevus    No hx of skin cancer.  She had a lot of sun exposure growing up; wears sunscreen and avoids the sun as an adult.  Tanning beds in the past as a teen.     Review of Systems:  Gen: Feels well, good sense of health.  Skin: No changing moles or lesions.    Past Medical History, Family History, Surgical History, Medications and Allergies reviewed.    Past Medical History:   Diagnosis Date    Bunion     IUD     Nosebleed     PONV (postoperative nausea and vomiting)     Vertigo     occ       Past

## 2024-10-10 ENCOUNTER — PROCEDURE VISIT (OUTPATIENT)
Dept: DERMATOLOGY | Age: 49
End: 2024-10-10

## 2024-10-10 DIAGNOSIS — L81.4 LENTIGINES: Primary | ICD-10-CM

## 2024-10-10 PROCEDURE — DM01525 PIGMENTED LASER MEDIUM AREA (EG. FOREARMS): Performed by: DERMATOLOGY

## 2024-10-10 NOTE — PROGRESS NOTES
Laser Procedure Note       Kendy Sanchez   YOB: 1975    DATE OF VISIT:  10/10/2024  Chief Complaint   Patient presents with    Laser Treatment     LASER: IPL and GL  DIAGNOSIS: Lentigines/photodamage    Here for treatment of facial photodamage and lentigines.  No previous treatment.  Discussed at last visit earlier this week.    We discussed treatment options, including no treatment as well as the following:  - need for multiple treatments and risk of incomplete clearance, recurrence  - risk of erythema, edema, purpura, dyspigmentation and scarring    PATIENT IDENTIFIED PER PROTOCOL: yes  LOCATION(S): face  VERIFIED AND MARKED: yes  TECHNIQUES, RISKS, BENEFITS AND ALTERNATIVES EXPLAINED: yes  CONSENT SIGNED, WITNESSED AND DATED: yes        OPERATIVE REPORT    DIAGNOSIS, LOCATION, PROCEDURE RECONFIRMED: yes   APPROPRIATE EYE PROTECTION for PATIENT: yes  APPROPRIATE EYE PROTECTION for PROVIDER and OTHERS PRESENT: yes  ANESTHESIA/PRE-OP MEDICATIONS: none  LASER SETTINGS:  (1)  WAVELENGTH: IPL (Lumenis) - 10 J (first trx)   (2)  WAVELENGTH: 755 nm -GentleLASE LENS: 8 mm FLUENCE: 40 J  COOLING: Off    PROCEDURE NOTE:  Ultrasound gel applied to the face and treated with single pass with setting 1 - IPL -without complications.  Then discrete small lentigines treated with single pulses with setting 2 with appropriate response.    POST-OPERATIVE CARE/DISPOSITION: Cool packs and Aquaphor as needed  COMPLICATIONS: none  MEDICATIONS: none  WOUND CARE INSTRUCTIONS PROVIDED: yes    Follow-up in 2 months.    300

## 2024-10-10 NOTE — PATIENT INSTRUCTIONS
Following the procedure, the treated area may be red and crusted or scabbing may occur.  The affected areas should be treated delicately following treatment.  Discomfort may be treated with the application of cool compresses and the area should be cleaned gently daily with mild cleanser and water and covered with aquaphor at least daily until healed.  Improper care of the treated area while healing may increase the chance of scarring or skin textural changes to the treated area.    Avoid tanning or self-tanners while undergoing treatment - tan skin increases your chances or dyspigmentation and complications.    Call the office if you have fever, chills, or excess pain that is not relieved with Tylenol or inbuprofen, or any other concerns or questions following your treatment.    300

## 2024-11-13 NOTE — PROGRESS NOTES
Laser Procedure Note       Kendy Sanchez   YOB: 1975    DATE OF VISIT:  11/14/2024  Chief Complaint   Patient presents with    Procedure     LASER: IPL and GL  DIAGNOSIS: Lentigines/photodamage - f/u , treated 1 month ago    Here for treatment of facial photodamage and lentigines.    1 previous treatment -  - noticeable improvement - some lesions resolved and all others faded                       We discussed treatment options, including no treatment as well as the following:  - need for multiple treatments and risk of incomplete clearance, recurrence  - risk of erythema, edema, purpura, dyspigmentation and scarring    PATIENT IDENTIFIED PER PROTOCOL: yes  LOCATION(S): face  VERIFIED AND MARKED: yes  TECHNIQUES, RISKS, BENEFITS AND ALTERNATIVES EXPLAINED: yes  CONSENT SIGNED, WITNESSED AND DATED: yes        OPERATIVE REPORT    DIAGNOSIS, LOCATION, PROCEDURE RECONFIRMED: yes   APPROPRIATE EYE PROTECTION for PATIENT: yes  APPROPRIATE EYE PROTECTION for PROVIDER and OTHERS PRESENT: yes  ANESTHESIA/PRE-OP MEDICATIONS: none  LASER SETTINGS:  (1)  WAVELENGTH: IPL (Lumenis) -  increased to 13 from 10 J (first trx)   (2)  WAVELENGTH: 755 nm -GentleLASE LENS: 8 mm FLUENCE: 40 J  COOLING: Off    PROCEDURE NOTE:  Ultrasound gel applied to the face and treated with single pass with setting 1 - IPL -without complications.  Then discrete small lentigines treated with single pulses with setting 2 with appropriate response.    POST-OPERATIVE CARE/DISPOSITION: Cool packs and Aquaphor as needed  COMPLICATIONS: none  MEDICATIONS: none  WOUND CARE INSTRUCTIONS PROVIDED: yes    Follow-up prn.  Can consider VBeam for focal telangiectasias in the future.    250 (decreased from 300 - fewer spots)

## 2024-11-14 ENCOUNTER — PROCEDURE VISIT (OUTPATIENT)
Dept: DERMATOLOGY | Age: 49
End: 2024-11-14

## 2024-11-14 DIAGNOSIS — L81.4 LENTIGINES: Primary | ICD-10-CM

## 2024-11-14 NOTE — PATIENT INSTRUCTIONS
Following the procedure, the treated area may be red and crusted or scabbing may occur.  The affected areas should be treated delicately following treatment.  Discomfort may be treated with the application of cool compresses and the area should be cleaned gently daily with mild cleanser and water and covered with aquaphor at least daily until healed.  Improper care of the treated area while healing may increase the chance of scarring or skin textural changes to the treated area.    Avoid tanning or self-tanners while undergoing treatment - tan skin increases your chances or dyspigmentation and complications.    Call the office if you have fever, chills, or excess pain that is not relieved with Tylenol or inbuprofen, or any other concerns or questions following your treatment.    250

## 2025-05-27 ENCOUNTER — PROCEDURE VISIT (OUTPATIENT)
Dept: AUDIOLOGY | Age: 50
End: 2025-05-27
Payer: COMMERCIAL

## 2025-05-27 ENCOUNTER — OFFICE VISIT (OUTPATIENT)
Dept: ENT CLINIC | Age: 50
End: 2025-05-27
Payer: COMMERCIAL

## 2025-05-27 VITALS
HEIGHT: 67 IN | HEART RATE: 64 BPM | DIASTOLIC BLOOD PRESSURE: 96 MMHG | BODY MASS INDEX: 21.5 KG/M2 | SYSTOLIC BLOOD PRESSURE: 156 MMHG | WEIGHT: 137 LBS | TEMPERATURE: 97.3 F | OXYGEN SATURATION: 98 %

## 2025-05-27 DIAGNOSIS — H93.12 TINNITUS OF LEFT EAR: ICD-10-CM

## 2025-05-27 DIAGNOSIS — H90.42 SENSORINEURAL HEARING LOSS (SNHL) OF LEFT EAR WITH UNRESTRICTED HEARING OF RIGHT EAR: ICD-10-CM

## 2025-05-27 DIAGNOSIS — H83.02 LABYRINTHITIS OF LEFT EAR: ICD-10-CM

## 2025-05-27 DIAGNOSIS — H91.20 SUDDEN-ONSET SENSORINEURAL HEARING LOSS: Primary | ICD-10-CM

## 2025-05-27 DIAGNOSIS — R42 DIZZINESS AND GIDDINESS: ICD-10-CM

## 2025-05-27 DIAGNOSIS — F40.240 CLAUSTROPHOBIA: ICD-10-CM

## 2025-05-27 DIAGNOSIS — H90.42 SENSORINEURAL HEARING LOSS (SNHL) OF LEFT EAR WITH UNRESTRICTED HEARING OF RIGHT EAR: Primary | ICD-10-CM

## 2025-05-27 DIAGNOSIS — H90.3 ASYMMETRICAL SENSORINEURAL HEARING LOSS: ICD-10-CM

## 2025-05-27 PROCEDURE — 92557 COMPREHENSIVE HEARING TEST: CPT | Performed by: AUDIOLOGIST

## 2025-05-27 PROCEDURE — 99205 OFFICE O/P NEW HI 60 MIN: CPT | Performed by: STUDENT IN AN ORGANIZED HEALTH CARE EDUCATION/TRAINING PROGRAM

## 2025-05-27 PROCEDURE — 92567 TYMPANOMETRY: CPT | Performed by: AUDIOLOGIST

## 2025-05-27 RX ORDER — VALACYCLOVIR HYDROCHLORIDE 1 G/1
1000 TABLET, FILM COATED ORAL 3 TIMES DAILY
Qty: 21 TABLET | Refills: 0 | Status: SHIPPED | OUTPATIENT
Start: 2025-05-27 | End: 2025-06-03

## 2025-05-27 RX ORDER — MECLIZINE HCL 12.5 MG 12.5 MG/1
12.5 TABLET ORAL 3 TIMES DAILY PRN
COMMUNITY
Start: 2025-05-08

## 2025-05-27 RX ORDER — PSEUDOEPHEDRINE HCL 30 MG/1
30 TABLET, FILM COATED ORAL EVERY 4 HOURS PRN
COMMUNITY

## 2025-05-27 RX ORDER — LORAZEPAM 1 MG/1
1 TABLET ORAL ONCE
Qty: 1 TABLET | Refills: 0 | Status: SHIPPED | OUTPATIENT
Start: 2025-05-27 | End: 2025-05-27

## 2025-05-27 RX ORDER — PREDNISONE 10 MG/1
TABLET ORAL
Qty: 75 TABLET | Refills: 0 | Status: SHIPPED | OUTPATIENT
Start: 2025-05-27

## 2025-05-27 NOTE — PROGRESS NOTES
Kendy Sanchez   1975, 50 y.o. female   [id number]       Referring Provider: Leo Elias DO  Referral Type: Referring Provider Encounter Note    Reason for Visit: Evaluation of suspected change in hearing, tinnitus, or balance.    ADULT AUDIOLOGIC EVALUATION      Kendy Sanchez is a 50 y.o. female seen today, 5/27/2025, for an initial audiologic evaluation.      AUDIOLOGIC AND OTHER PERTINENT MEDICAL HISTORY:        Kendy Sanchez noted concern for sudden onset change in hearing about 3.5 weeks ago in left ear, woke up concerned she was having vertigo which she also had about 3 years ago, did note some dizziness which is improving, also taking Meclizine to manage dizziness; tinnitus in left ear, sounded like thunder at onset; some sensitivity to touch in left ear, tender during examinations.    She denied otalgia, otorrhea, history of noise exposure, history of ear surgery, and family history of hearing loss.    IMPRESSIONS:        Today's results revealed left ear sensorineural hearing loss with 0% word recognition at limits of the equipment and right ear within normal limits with excellent word recognition; both ears with normal middle ear function. Reviewed findings with patient.  Follow medical recommendations of Leo Elias DO.     ASSESSMENT AND FINDINGS:       Otoscopy revealed: Clear ear canals bilaterally    RIGHT EAR:  Hearing Sensitivity: Within normal limits.  Speech Recognition Threshold: 10 dBHL  Word Recognition: Excellent (100%), based on NU-6 25-word list at 45 dBHL using recorded speech stimuli.    Tympanometry: Normal peak pressure and compliance, Type A tympanogram, consistent with normal middle ear function.       LEFT EAR:  Hearing Sensitivity: Severe to profound sensorineural hearing loss with no response at the limits of the equipment 4551-5057 Hz.  Speech Recognition Threshold: NR at 110 dBHL. Speech Detection Threshold: 70 dBHL, masked  Word Recognition: Very Poor (0%),

## 2025-05-27 NOTE — PROGRESS NOTES
[facility]  DIVISION OF OTOLARYNGOLOGY- HEAD & NECK SURGERY  NEW PATIENT HISTORY AND PHYSICAL NOTE      Patient Name: Kendy Sanchez  Medical Record Number:  [id number]  Primary Care Physician:  Jinny Hall MD    ChiefComplaint     Chief Complaint   Patient presents with    Hearing Problem     Hearing loss in Lt ear started end of April,        History of Present Illness     Kendy Sanchez is an 50 y.o. female presenting with left-sided hearing loss that started suddenly in the middle of the night.  She woke up and could not hear out of her left ear and was vertiginous with the room spinning vertigo.  Vertigo has improved and she has been taking meclizine as needed.  She also had right sided tinnitus, initially sounded like \"thunder rumbling\", sounds more like static now.  Has some mild sensitivity to her left ear.  She went to urgent care throughout all of this and had 2 days of prednisone total.  No history of diabetes. No otalgia or otorrhea.  No history of chronic ear infections.  No history of otologic surgery.  No family history of early onset hearing loss.  No loud noise exposures.     She has difficulty getting MRI secondary to claustrophobia      Past Medical History     Past Medical History:   Diagnosis Date    Bunion     IUD     Nosebleed     PONV (postoperative nausea and vomiting)     Vertigo     occ       Past Surgical History     Past Surgical History:   Procedure Laterality Date    BACK SURGERY      cervical    BUNIONECTOMY      left       Family History     No family history on file.    Social History     Social History     Tobacco Use    Smoking status: Never    Smokeless tobacco: Never   Vaping Use    Vaping status: Never Used   Substance Use Topics    Alcohol use: Yes     Comment: soc    Drug use: Never        Allergies     Allergies   Allergen Reactions    Penicillins Rash       Medications     Current Outpatient Medications   Medication Sig Dispense Refill    meclizine

## 2025-06-02 ENCOUNTER — HOSPITAL ENCOUNTER (OUTPATIENT)
Dept: MRI IMAGING | Age: 50
Discharge: HOME | End: 2025-06-02
Attending: STUDENT IN AN ORGANIZED HEALTH CARE EDUCATION/TRAINING PROGRAM
Payer: COMMERCIAL

## 2025-06-02 DIAGNOSIS — H90.3 ASYMMETRICAL SENSORINEURAL HEARING LOSS: ICD-10-CM

## 2025-06-02 DIAGNOSIS — H91.20 SUDDEN-ONSET SENSORINEURAL HEARING LOSS: ICD-10-CM

## 2025-06-02 PROCEDURE — 70553 MRI BRAIN STEM W/O & W/DYE: CPT

## 2025-06-02 PROCEDURE — A9579 GAD-BASE MR CONTRAST NOS,1ML: HCPCS | Performed by: STUDENT IN AN ORGANIZED HEALTH CARE EDUCATION/TRAINING PROGRAM

## 2025-06-02 PROCEDURE — 6360000004 HC RX CONTRAST MEDICATION: Performed by: STUDENT IN AN ORGANIZED HEALTH CARE EDUCATION/TRAINING PROGRAM

## 2025-06-02 RX ADMIN — GADOTERIDOL 12 ML: 279.3 INJECTION, SOLUTION INTRAVENOUS at 08:53

## 2025-06-12 ENCOUNTER — OFFICE VISIT (OUTPATIENT)
Dept: ENT CLINIC | Age: 50
End: 2025-06-12
Payer: COMMERCIAL

## 2025-06-12 ENCOUNTER — PROCEDURE VISIT (OUTPATIENT)
Dept: AUDIOLOGY | Age: 50
End: 2025-06-12
Payer: COMMERCIAL

## 2025-06-12 VITALS
WEIGHT: 134 LBS | DIASTOLIC BLOOD PRESSURE: 88 MMHG | BODY MASS INDEX: 21.03 KG/M2 | OXYGEN SATURATION: 98 % | SYSTOLIC BLOOD PRESSURE: 131 MMHG | HEART RATE: 59 BPM | TEMPERATURE: 97.1 F | HEIGHT: 67 IN

## 2025-06-12 DIAGNOSIS — H83.02 LABYRINTHITIS OF LEFT EAR: ICD-10-CM

## 2025-06-12 DIAGNOSIS — H90.42 SENSORINEURAL HEARING LOSS (SNHL) OF LEFT EAR WITH UNRESTRICTED HEARING OF RIGHT EAR: Primary | ICD-10-CM

## 2025-06-12 DIAGNOSIS — H93.12 TINNITUS OF LEFT EAR: ICD-10-CM

## 2025-06-12 DIAGNOSIS — H90.3 ASYMMETRICAL SENSORINEURAL HEARING LOSS: ICD-10-CM

## 2025-06-12 DIAGNOSIS — R26.89 IMBALANCE: ICD-10-CM

## 2025-06-12 DIAGNOSIS — H91.20 SUDDEN-ONSET SENSORINEURAL HEARING LOSS: Primary | ICD-10-CM

## 2025-06-12 PROCEDURE — 92553 AUDIOMETRY AIR & BONE: CPT

## 2025-06-12 PROCEDURE — G8420 CALC BMI NORM PARAMETERS: HCPCS | Performed by: STUDENT IN AN ORGANIZED HEALTH CARE EDUCATION/TRAINING PROGRAM

## 2025-06-12 PROCEDURE — 1036F TOBACCO NON-USER: CPT | Performed by: STUDENT IN AN ORGANIZED HEALTH CARE EDUCATION/TRAINING PROGRAM

## 2025-06-12 PROCEDURE — 99214 OFFICE O/P EST MOD 30 MIN: CPT | Performed by: STUDENT IN AN ORGANIZED HEALTH CARE EDUCATION/TRAINING PROGRAM

## 2025-06-12 PROCEDURE — 69801 INCISE INNER EAR: CPT | Performed by: STUDENT IN AN ORGANIZED HEALTH CARE EDUCATION/TRAINING PROGRAM

## 2025-06-12 PROCEDURE — G8427 DOCREV CUR MEDS BY ELIG CLIN: HCPCS | Performed by: STUDENT IN AN ORGANIZED HEALTH CARE EDUCATION/TRAINING PROGRAM

## 2025-06-12 NOTE — PROGRESS NOTES
the patient:   - Good Communication Strategies  - Hearing Loss and Hearing Aids  Cochlear Implant Evaluation    Educational information was shared in the After Visit Summary.                                                RECOMMENDATIONS:                                                                                                                                                                                                                                                            The following items are recommended based on patient report and results from today's appointment:   - Continue medical follow-up with Leo Elias DO.   - Retest hearing as medically indicated and/or sooner if a change in hearing is noted.  - If desired, schedule a Hearing Aid Evaluation (HAE) appointment to discuss hearing aid options.  - Utilize \"Good Communication Strategies\" as discussed to assist in speech understanding with communication partners.  - Recommend cochlear implant evaluation for trial of BiCROS devices and determine cochlear implant candidacy pending medical management     Michelle Marie  Audiologist    Chart CC'd to: Leo Elias DO      Degree of   Hearing Sensitivity dB Range   Within Normal Limits (WNL) 0 - 20   Mild 20 - 40   Moderate 40 - 55   Moderately-Severe 55 - 70   Severe 70 - 90   Profound 90 +     Portions of this note were dictated using Dragon. There may be linguistic errors secondary to the use of this program.

## 2025-06-12 NOTE — PATIENT INSTRUCTIONS
What is a cochlear implant?         A cochlear implant is a small, complex electronic device that can help to provide a sense of sound to a person who is profoundly deaf or severely hard-of-hearing. The implant consists of an external portion that sits behind the ear and a second portion that is surgically placed under the skin (see figure). An implant has the following parts:  A microphone, which picks up sound from the environment.  A speech processor, which selects and arranges sounds picked up by the microphone.  A transmitter and /stimulator, which receive signals from the speech processor and convert them into electric impulses.  An electrode array, which is a group of electrodes that collects the impulses from the stimulator and sends them to different regions of the auditory nerve.  An implant does not restore normal hearing. Instead, it can give a person with hearing loss a useful representation of sounds in the environment and help him or her to understand speech.    How does a cochlear implant work?  A cochlear implant is very different from a hearing aid. Hearing aids amplify sounds so they may be detected by damaged ears. Cochlear implants bypass damaged portions of the ear and directly stimulate the auditory nerve. Signals generated by the implant are sent by way of the auditory nerve to the brain, which recognizes the signals as sound. Hearing through a cochlear implant is different from normal hearing and takes time to learn or relearn. However, it allows many people to recognize warning signals, understand other sounds in the environment, and understand speech in person or over the telephone.    How does someone receive a cochlear implant?  Use of a cochlear implant requires both a surgical procedure and significant therapy to learn or relearn the sense of hearing. Not everyone performs at the same level with this device. The decision to receive an implant should involve discussions with

## 2025-06-12 NOTE — PROGRESS NOTES
taking: Reported on 6/12/2025)      predniSONE (DELTASONE) 10 MG tablet 6 tabs/day x 10 days, then 5 tabs/day x1 day, then 4 tabs/day x1 day, then 3 tabs/day x1 day, then 2 tabs/day x1 day, then 1 tab/day x1 day. Take with food. (Patient not taking: Reported on 6/12/2025) 75 tablet 0    tretinoin (RETIN-A) 0.025 % cream Apply pea sized amount to face QOD, then increase to every night as tolerated. (Patient not taking: Reported on 6/12/2025) 1 each 1    triamcinolone (KENALOG) 0.1 % cream Apply to affected area BID PRN flares of pruritus. (Patient not taking: Reported on 6/12/2025) 80 g 2    ketoconazole (NIZORAL) 2 % shampoo Apply to affected area with bumps daily x 2 weeks and prn flares.  Apply in the shower, leave on for 5 minutes and rinse. (Patient not taking: Reported on 6/12/2025) 120 mL 4     No current facility-administered medications for this visit.       Review of Systems     REVIEW OF SYSTEM: See HPI above    PhysicalExam     Vitals:    06/12/25 0956   BP: 131/88   Pulse: 59   Temp: 97.1 °F (36.2 °C)   TempSrc: Infrared   SpO2: 98%   Weight: 60.8 kg (134 lb)   Height: 1.702 m (5' 7\")       PHYSICAL EXAM  /88   Pulse 59   Temp 97.1 °F (36.2 °C) (Infrared)   Ht 1.702 m (5' 7\")   Wt 60.8 kg (134 lb)   SpO2 98%   BMI 20.99 kg/m²     GENERAL: No acute distress, alert and oriented.  EYES: EOMI, Anti-icteric.  NOSE: On anterior rhinoscopy there is no epistaxis, nasal mucosa moist and normal appearing, no purulent drainage.   EARS: Normal external appearance; on portable otomicroscopy:     -Ad: External auditory canal without stenosis, tympanic membrane clear, no middle ear effusions or retractions     -As: External auditory canal without stenosis, tympanic membrane clear, no middle ear effusions or retractions  FACE: HB 1/6 bilaterally, symmetric appearing, sensation equal bilaterally  ORAL CAVITY: No masses or lesions visualized or palpated, uvula is midline, moist mucous membranes, no

## 2025-06-26 ENCOUNTER — OFFICE VISIT (OUTPATIENT)
Dept: ENT CLINIC | Age: 50
End: 2025-06-26
Payer: COMMERCIAL

## 2025-06-26 VITALS
OXYGEN SATURATION: 98 % | BODY MASS INDEX: 21.03 KG/M2 | HEIGHT: 67 IN | WEIGHT: 134 LBS | DIASTOLIC BLOOD PRESSURE: 84 MMHG | HEART RATE: 60 BPM | SYSTOLIC BLOOD PRESSURE: 131 MMHG | TEMPERATURE: 97.3 F

## 2025-06-26 DIAGNOSIS — H83.02 LABYRINTHITIS OF LEFT EAR: ICD-10-CM

## 2025-06-26 DIAGNOSIS — H90.3 ASYMMETRICAL SENSORINEURAL HEARING LOSS: ICD-10-CM

## 2025-06-26 DIAGNOSIS — H91.20 SUDDEN-ONSET SENSORINEURAL HEARING LOSS: Primary | ICD-10-CM

## 2025-06-26 PROCEDURE — 69801 INCISE INNER EAR: CPT | Performed by: STUDENT IN AN ORGANIZED HEALTH CARE EDUCATION/TRAINING PROGRAM

## 2025-06-26 NOTE — PROGRESS NOTES
[facility]  DIVISION OF OTOLARYNGOLOGY- HEAD & NECK SURGERY  CLINIC FOLLOW-UP VISIT      Patient Name: Kendy Sanchez  Medical Record Number:  [id number]  Primary Care Physician:  Jinny Hall MD    ChiefComplaint     Chief Complaint   Patient presents with    Follow-up     F/u hearing loss, still with pressure and ringing in Lt Ear,        History of Present Illness     Kendy Sanchez is an 50 y.o. female previously seen for left SSNHL and labyrinthitis    Interval History:   Hearing static sounds in left ear. No subjective improvement in hearing. No otalgia or otorrhea. Has been having more headaches recently.     Past Medical History     Past Medical History:   Diagnosis Date    Bunion     IUD     Nosebleed     PONV (postoperative nausea and vomiting)     Vertigo     occ       Past Surgical History     Past Surgical History:   Procedure Laterality Date    BACK SURGERY      cervical    BUNIONECTOMY      left       Family History     No family history on file.    Social History     Social History     Tobacco Use    Smoking status: Never    Smokeless tobacco: Never   Vaping Use    Vaping status: Never Used   Substance Use Topics    Alcohol use: Yes     Comment: soc    Drug use: Never        Allergies     Allergies   Allergen Reactions    Penicillins Rash       Medications     Current Outpatient Medications   Medication Sig Dispense Refill    meclizine (ANTIVERT) 12.5 MG tablet Take 1 tablet by mouth 3 times daily as needed (Patient not taking: Reported on 6/26/2025)      pseudoephedrine (SUDAFED) 30 MG tablet Take 1 tablet by mouth every 4 hours as needed for Congestion (Patient not taking: Reported on 6/26/2025)      predniSONE (DELTASONE) 10 MG tablet 6 tabs/day x 10 days, then 5 tabs/day x1 day, then 4 tabs/day x1 day, then 3 tabs/day x1 day, then 2 tabs/day x1 day, then 1 tab/day x1 day. Take with food. (Patient not taking: Reported on 6/26/2025) 75 tablet 0    tretinoin (RETIN-A) 0.025 % cream

## 2025-07-10 ENCOUNTER — OFFICE VISIT (OUTPATIENT)
Dept: ENT CLINIC | Age: 50
End: 2025-07-10

## 2025-07-10 VITALS
HEIGHT: 67 IN | HEART RATE: 61 BPM | TEMPERATURE: 97.5 F | WEIGHT: 134 LBS | SYSTOLIC BLOOD PRESSURE: 144 MMHG | OXYGEN SATURATION: 98 % | BODY MASS INDEX: 21.03 KG/M2 | DIASTOLIC BLOOD PRESSURE: 96 MMHG

## 2025-07-10 DIAGNOSIS — H83.02 LABYRINTHITIS OF LEFT EAR: ICD-10-CM

## 2025-07-10 DIAGNOSIS — H91.20 SUDDEN-ONSET SENSORINEURAL HEARING LOSS: Primary | ICD-10-CM

## 2025-07-10 RX ORDER — DEXAMETHASONE SODIUM PHOSPHATE 10 MG/ML
10 INJECTION, SOLUTION INTRA-ARTICULAR; INTRALESIONAL; INTRAMUSCULAR; INTRAVENOUS; SOFT TISSUE ONCE
Status: COMPLETED | OUTPATIENT
Start: 2025-07-10 | End: 2025-07-10

## 2025-07-10 RX ADMIN — DEXAMETHASONE SODIUM PHOSPHATE 10 MG: 10 INJECTION, SOLUTION INTRA-ARTICULAR; INTRALESIONAL; INTRAMUSCULAR; INTRAVENOUS; SOFT TISSUE at 08:59

## 2025-07-10 NOTE — PROGRESS NOTES
lesions/tumors  NEURO: Cranial Nerves 2, 3, 4, 5, 6, 7, 11, 12 grossly intact bilaterally     I have performed a head and neck physical exam personally or was physically present during the key or critical portions of the service.    Procedure     Procedure: Transtympanic Dexamethasone Injection (68563)     Pre-op diagnosis: Left sided sudden sensorineural hearing loss  Post-op diagnosis: Same  Surgeon: Dr. Leo Elias DO  Consent: Written, obtained  Anes: Topical phenol  Description of procedure: The patient was placed in a supine position. The left ear was examined under otomicroscopy. Topical phenol was applied to the superior aspect of the right tympanic membrane at the area of the previous injection. Then, 1cc of 0.8 mg/1ml of Dexamethasone was injected into the middle ear using a 25 gauge needle. The patient was instructed to remain in the supine position without swallowing for 10 minutes. The patient tolerated the procedure well. There were no complications with the procedure.     Assessment and Plan     1. Sudden-onset sensorineural hearing loss  2. Labyrinthitis of left ear  -Third intratympanic steroid injection performed today.  Follow-up in 2 weeks with audiogram to see if there is any improvement in hearing      Follow-Up     Return in about 2 weeks (around 7/24/2025) for audiogram prior to appointment.      Dr. Leo Elias DO  [facility]  Department of Otolaryngology/Head and Neck Surgery  7/10/25    Medical Decision Making:  The following items were considered in medical decision making:  Independent review of images  Review / order clinical lab tests  Review / order radiology tests  Decision to obtain old records      This note was generated completely or in part utilizing Dragon dictation speech recognition software.  Occasionally, words are mistranscribed and despite editing, the text may contain inaccuracies due to incorrect word recognition.  If further

## 2025-08-06 ENCOUNTER — HOSPITAL ENCOUNTER (EMERGENCY)
Age: 50
Discharge: HOME | End: 2025-08-06
Payer: COMMERCIAL

## 2025-08-06 VITALS
HEIGHT: 67 IN | TEMPERATURE: 98.1 F | WEIGHT: 136.3 LBS | OXYGEN SATURATION: 100 % | SYSTOLIC BLOOD PRESSURE: 163 MMHG | DIASTOLIC BLOOD PRESSURE: 102 MMHG | RESPIRATION RATE: 16 BRPM | HEART RATE: 68 BPM | BODY MASS INDEX: 21.39 KG/M2

## 2025-08-06 DIAGNOSIS — T16.1XXA FOREIGN BODY OF RIGHT EAR, INITIAL ENCOUNTER: Primary | ICD-10-CM

## 2025-08-06 PROCEDURE — 69200 CLEAR OUTER EAR CANAL: CPT

## 2025-08-06 PROCEDURE — 99282 EMERGENCY DEPT VISIT SF MDM: CPT

## 2025-08-06 ASSESSMENT — LIFESTYLE VARIABLES
HOW MANY STANDARD DRINKS CONTAINING ALCOHOL DO YOU HAVE ON A TYPICAL DAY: PATIENT DOES NOT DRINK
HOW OFTEN DO YOU HAVE A DRINK CONTAINING ALCOHOL: NEVER

## 2025-08-06 ASSESSMENT — PAIN - FUNCTIONAL ASSESSMENT: PAIN_FUNCTIONAL_ASSESSMENT: NONE - DENIES PAIN
